# Patient Record
Sex: MALE
[De-identification: names, ages, dates, MRNs, and addresses within clinical notes are randomized per-mention and may not be internally consistent; named-entity substitution may affect disease eponyms.]

---

## 2019-09-04 LAB
BASOPHILS # BLD AUTO: 0.1 10*3/UL (ref 0–0.1)
BASOPHILS NFR BLD AUTO: 0.7 % (ref 0–1)
DEPRECATED NEUTROPHILS # BLD AUTO: 3.5 10*3/UL (ref 2.1–6.9)
EOSINOPHIL # BLD AUTO: 0.2 10*3/UL (ref 0–0.4)
EOSINOPHIL NFR BLD AUTO: 2.5 % (ref 0–6)
ERYTHROCYTE [DISTWIDTH] IN CORD BLOOD: 12.4 % (ref 11.7–14.4)
HCT VFR BLD AUTO: 43.2 % (ref 38.2–49.6)
HGB BLD-MCNC: 14.7 G/DL (ref 14–18)
LYMPHOCYTES # BLD: 2.8 10*3/UL (ref 1–3.2)
LYMPHOCYTES NFR BLD AUTO: 39.6 % (ref 18–39.1)
MCH RBC QN AUTO: 32.1 PG (ref 28–32)
MCHC RBC AUTO-ENTMCNC: 34 G/DL (ref 31–35)
MCV RBC AUTO: 94.3 FL (ref 81–99)
MONOCYTES # BLD AUTO: 0.6 10*3/UL (ref 0.2–0.8)
MONOCYTES NFR BLD AUTO: 8.6 % (ref 4.4–11.3)
NEUTS SEG NFR BLD AUTO: 48 % (ref 38.7–80)
PLATELET # BLD AUTO: 331 X10E3/UL (ref 140–360)
RBC # BLD AUTO: 4.58 X10E6/UL (ref 4.3–5.7)

## 2019-09-04 NOTE — DIAGNOSTIC IMAGING REPORT
EXAM:  CHEST 2 VIEWS



DATE: 9/4/2019 10:53 AM  



INDICATION: Hallux valgus, left foot pain, preoperative evaluation 



COMPARISON: None



FINDINGS:

The trachea is midline. The lungs are symmetrically expanded without evidence

for large focal consolidation, pneumothorax, or significant pleural effusion.



The cardiomediastinal silhouette and pulmonary vasculature are within normal

limits. No acute osseous abnormality is identified. The surrounding soft

tissues are unremarkable.





IMPRESSION:



No acute cardiopulmonary process identified.



Signed by: Dr. Ray Watters MD on 9/4/2019 12:45 PM

## 2019-09-05 ENCOUNTER — HOSPITAL ENCOUNTER (OUTPATIENT)
Dept: HOSPITAL 88 - OR | Age: 39
Discharge: HOME | End: 2019-09-05
Attending: PODIATRIST
Payer: COMMERCIAL

## 2019-09-05 VITALS — DIASTOLIC BLOOD PRESSURE: 93 MMHG | SYSTOLIC BLOOD PRESSURE: 146 MMHG

## 2019-09-05 DIAGNOSIS — Z01.818: ICD-10-CM

## 2019-09-05 DIAGNOSIS — M20.12: Primary | ICD-10-CM

## 2019-09-05 DIAGNOSIS — M72.2: ICD-10-CM

## 2019-09-05 DIAGNOSIS — R73.03: ICD-10-CM

## 2019-09-05 DIAGNOSIS — Z01.810: ICD-10-CM

## 2019-09-05 DIAGNOSIS — Z01.812: ICD-10-CM

## 2019-09-05 DIAGNOSIS — G47.33: ICD-10-CM

## 2019-09-05 DIAGNOSIS — M77.32: ICD-10-CM

## 2019-09-05 DIAGNOSIS — I10: ICD-10-CM

## 2019-09-05 DIAGNOSIS — G57.52: ICD-10-CM

## 2019-09-05 DIAGNOSIS — G58.8: ICD-10-CM

## 2019-09-05 DIAGNOSIS — M20.42: ICD-10-CM

## 2019-09-05 PROCEDURE — 28285 REPAIR OF HAMMERTOE: CPT

## 2019-09-05 PROCEDURE — 73620 X-RAY EXAM OF FOOT: CPT

## 2019-09-05 PROCEDURE — 64704 REVISE HAND/FOOT NERVE: CPT

## 2019-09-05 PROCEDURE — 28035 DECOMPRESSION OF TIBIA NERVE: CPT

## 2019-09-05 PROCEDURE — 85025 COMPLETE CBC W/AUTO DIFF WBC: CPT

## 2019-09-05 PROCEDURE — 82948 REAGENT STRIP/BLOOD GLUCOSE: CPT

## 2019-09-05 PROCEDURE — 71046 X-RAY EXAM CHEST 2 VIEWS: CPT

## 2019-09-05 PROCEDURE — 36415 COLL VENOUS BLD VENIPUNCTURE: CPT

## 2019-09-05 PROCEDURE — 28296 COR HLX VLGS DSTL MTAR OSTEO: CPT

## 2019-09-05 PROCEDURE — 28119 REMOVAL OF HEEL SPUR: CPT

## 2019-09-05 PROCEDURE — 93005 ELECTROCARDIOGRAM TRACING: CPT

## 2019-09-05 NOTE — XMS REPORT
Clinical Summary

                             Created on: 2019



Steven Brown

External Reference #: [id number]

: 1980

Sex: Male



Demographics







                          Address                   [address]

[place]  [address]

 

                          Home Phone                [phone]

 

                          Preferred Language        English

 

                          Marital Status            

 

                          Tenriism Affiliation     [id number]

 

                          Race                      White

 

                          Ethnic Group              /Latin





Author







                          Author                    [facility]

 

                          Organization              [facility]

 

                          Address                   Unknown

 

                          Phone                     Unavailable







Support







                Name            Relationship    Address         Phone

 

                Candi Brown    [employer]            Unknown         [phone]







Care Team Providers







                    Care Team Member Name    Role                Phone

 

                    Carly Hoffman NP    PCP                 [phone]







Allergies

Not on File



Medications

Not on file



Active Problems





Not on file



Encounters







                          Care Team                 Description



                     Date                Type                Specialty  

 

                                        



Kyler Douglas MD Kreit, Camil I, MD                      Lifelong obesity; 

Mixed hyperlipidemia; 

Type II diabetes mellitus with coma, uncontrolled; 

Essential hypertension, malignant



                     09/10/2018          Hospital            Radiology  



                                         Encounter   

 

                                        



Kyler Douglas MD                 Lifelong obesity (Primary Dx); 

Mixed hyperlipidemia; 

Type II diabetes mellitus with coma, uncontrolled; 

Essential hypertension, malignant



                     2018          Transcribe          Access  



                                         Orders   



after 2018



Social History







                                        Date



                 Tobacco Use     Types           Packs/Day       Years Used 

 

                                         



                                         Never Assessed    









 



                           Sex Assigned at Birth     Date Recorded

 

 



                                         Not on file 









                                        Industry



                           Job Start Date            Occupation 

 

                                        Not on file



                           Not on file               Not on file 









                                        Travel End



                           Travel History            Travel Start 

 





                                         No recent travel history available.







Last Filed Vital Signs

Not on file



Plan of Treatment







   



                 Health Maintenance     Due Date        Last Done       Comments

 

   



                           DIABETIC RETINAL EYE EXAM     1980  

 

   



                           DIABETIC FOOT EXAM        1989  

 

   



                           URINE MICROALBUMIN        1989  

 

   



                           INFLUENZA VACCINE         2019  







Procedures







                                        Comments



                 Procedure Name     Priority        Date/Time       Associated Diagnosis 

 

                                        



Results for this procedure are in the results section.



                 CT ABDOMEN W CONTRAST     Routine         09/10/2018      Lifelong obesity 



                           9:50 AM CDT               Mixed hyperlipidemia 



                                         Type II diabetes mellitus 



                                         with coma, uncontrolled 



                                         Essential hypertension, 



                                         malignant 



after 2018



Results

* CT Abdomen W Contrast (09/10/2018  9:50 AM CDT)









                                         Specimen

 











 



                           Narrative                 Performed At

 

 



                           EXAMINATION:CT ABDOMEN W CONTRAST     [facility]



                                         CLINICAL HISTORY:E66.9 Obesityunspecified, E78.2 Mixed hyperlipidemia, 





                                         E66.9 



                                         TECHNIQUE:Multiple axial images of the abdomen were obtained following 



                                         intravenous administration of iodinated contrast. Sagittal and coronal 



                                         computerized reformatted images were also obtained. CT scans are performed using

 



                                         radiation dose reduction 



                                         techniques. Technical factors are evaluated and adjusted to ensure appropriate 





                                         moderation of exposure. Automated dose management technology is applied to 



                                         adjust radiation exposure while achieving a diagnostic quality image. 



                                         COMPARISON:None. 



                                         LUNG BASES: 



                                         No suspicious lung nodule. Minimal atelectasis. No pleural effusion. Partially 





                                         seen heart is within normal limits. 



                                         ABDOMEN: 



                                         1. Liver: Liver is normal in size and contour. A 4 mm hypodensity in the liver 





                                         segment 6 on series 2 image 53 is too small to characterize. No intrahepatic 



                                         biliary ductal dilation. 



                                         2. Gallbladder: Gallbladder is normal in appearance. No extrahepatic biliary 



                                         ductal dilation. 



                                         3. Spleen: Spleen is normal in size. 



                                         4. Pancreas: Pancreas is normal in appearance. No ductal dilation or evidence 



                                         for mass lesion. 



                                         5. Adrenal Glands: Adrenals are normal in appearance. 



                                         6. Kidneys: Kidneys are normal in appearance. No mass lesion, hydronephrosis, or

 



                                         nephrolithiasis. 



                                         7. Nodes: No enlarged abdominal, mesenteric or retroperitoneal lymph node. 



                                         8. Bowel: Stomach, small bowel and colon are without acute anatomic abnormality.

 



                                         Appendix is normal. 



                                         9. Peritoneum: No free fluid or free air. No peritoneal nodules. 



                                         10.Retroperitoneum: Psoas muscles are symmetric. No retroperitoneal 



                                         hemorrhage, fluid collection, or mass lesion. 



                                         11.Aorta: No aneurysm. 



                                         PELVIS: 



                                         1.Bladder: Bladder is normal in apperance.. 



                                         2.: Prostate is possibly slightly enlarged at 3.5 x 5.2 cm. Seminal 



                                         vesicles are within normal limits symmetric. 



                                         3.Nodes: No enlarged pelvic or inguinal lymph node. 



                                         4.Free Fluid: No pelvic free fluid. 



                                         BONES: 



                                         No destructive bone lesion. 



                                         IMPRESSION: 



                                         1.No acute abdominopelvic process is appreciated. 



                                         I personally reviewed the images and the resident's findings and agree with the

 



                                         final report. 



                                         [facility]-[id number] 









                                        Procedure Note

 

                                        



[facility], Radiology Results Incoming - 09/10/2018 10:43 AM CDT



EXAMINATION:  CT ABDOMEN W CONTRAST



CLINICAL HISTORY:  E66.9 Obesity  unspecified, E78.2 Mixed hyperlipidemia, E66.9



TECHNIQUE:  Multiple axial images of the abdomen were obtained following 
intravenous administration of iodinated contrast. Sagittal and coronal 
computerized reformatted images were also obtained. CT scans are performed using
radiation dose reduction 

techniques. Technical factors are evaluated and adjusted to ensure appropriate 
moderation of exposure. Automated dose management technology is applied to 
adjust radiation exposure while achieving a diagnostic quality image.



COMPARISON:  None.



LUNG BASES: 



No suspicious lung nodule. Minimal atelectasis. No pleural effusion. Partially 
seen heart is within normal limits.



ABDOMEN:



                                        1. Liver: Liver is normal in size and contour. A 4 mm hypodensity in the liver segment

 6 on series 2 image 53 is too small to characterize. No intrahepatic biliary 
ductal dilation.



                                        2. Gallbladder: Gallbladder is normal in appearance. No extrahepatic biliary ductal

 dilation.



                                        3. Spleen: Spleen is normal in size.



                                        4. Pancreas: Pancreas is normal in appearance. No ductal dilation or evidence for

 mass lesion.



                                        5. Adrenal Glands: Adrenals are normal in appearance.



                                        6. Kidneys: Kidneys are normal in appearance. No mass lesion, hydronephrosis, or

 nephrolithiasis.



                                        7. Nodes: No enlarged abdominal, mesenteric or retroperitoneal lymph node.



                                        8. Bowel: Stomach, small bowel and colon are without acute anatomic abnormality.

 Appendix is normal.



                                        9. Peritoneum: No free fluid or free air. No peritoneal nodules.



                                        10.  Retroperitoneum: Psoas muscles are symmetric. No retroperitoneal hemorrhage,

 fluid collection, or mass lesion.



                                        11.  Aorta: No aneurysm.



PELVIS:



                                        1.  Bladder: Bladder is normal in apperance..



                                        2.  : Prostate is possibly slightly enlarged at 3.5 x 5.2 cm. Seminal vesicles

 are within normal limits symmetric.



                                        3.  Nodes: No enlarged pelvic or inguinal lymph node.



                                        4.  Free Fluid: No pelvic free fluid.



BONES: 



No destructive bone lesion.





IMPRESSION:



                                        1.  No acute abdominopelvic process is appreciated.









I personally reviewed the images and the resident's findings and agree with the 
final report.



[facility]-[id number]











   



                 Performing Organization     Address         City/State/Zipcode     Phone Number

 

   



                     [facility]          [address] [place] [address] 





after 2018



Insurance







                                        Type



            Payer      Benefit     Subscriber ID     Effective     Phone      Address 



                           Plan /                    Dates   



                                         Group     

 

                                        HMO



                 AETNA           AETNA           xxxxxxxxxx      2008-   



                           HMO,POS,EP                Present   



                                         O, [employer]     









     



            Guarantor Name     Account     Relation to     Date of     Phone      Billing Address



                     Type                Patient             Birth  

 

     



            Steven Brown     Personal/F     Self       1980     [phone]     [address]



                     [facility]               (Home)              [place] [address]







Advance Directives





For more information, please contact: [phone]









                          Patient Representative    Explanation



                           Type                      Date Recorded  

 

                                                     



                                         Advance Directives,   



                                         Living Will and   



                                         Medical Power of

## 2019-09-05 NOTE — DIAGNOSTIC IMAGING REPORT
EXAMINATION:  FOOT LEFT AP   LAT    



INDICATION: Postoperative



COMPARISON: None

     

FINDINGS:

There are post surgical findings of the distal left first metatarsal status

post osteotomy and hardware fixation. Alignment is near-anatomic. No unexpected

fracture. K wire fixation traverses the fourth interphalangeal joints. Drain in

place. Overlying gauze material obscures fine bony detail.



IMPRESSION: 

Postoperative findings of the left foot as above.



Signed by: Lexx Dutta MD on 9/5/2019 9:54 AM

## 2019-09-05 NOTE — XMS REPORT
Patient Summary Document

                             Created on: 2019



MEGHANA ORTEGA

External Reference #: [id number]

: 1980

Sex: Male



Demographics







                          Address                   [address] [place] [address]  [id number]

 

                          Home Phone                [phone]

 

                          Preferred Language        Unknown

 

                          Marital Status            Unknown

 

                          Yazidism Affiliation     Unknown

 

                          Race                      Unknown

 

                                        Additional Race(s)  

 

                          Ethnic Group              Unknown





Author







                          Author                    [facility]

 

                          Address                   Unknown

 

                          Phone                     Unavailable







Care Team Providers







                    Care Team Member Name    Role                Phone

 

                    BANG ODEN        Unavailable         Unavailable







Problems

This patient has no known problems.



Allergies, Adverse Reactions, Alerts

This patient has no known allergies or adverse reactions.



Medications

This patient has no known medications.



Results







           Test Description    Test Time    Test Comments    Text Results    Atomic Results    Result

 Comments

 

                CHEST 2 VIEWS    2019 12:44:00                                                             

                                             [place] [address]  
   Patient Name: MEGHANA ORTEGA                                   MR #: 
[id number]                     : 1980                                  
Age/Sex: 39/M  Acct #: [id number]                              Req #: [id number]
[id number]  Adm Physician:                                                      
Ordered by: BANG ODEN DPM                            Report #: [id number]      
 Location: OR                                      Room/Bed:                    
 
___________________________________________________________________________________________________
   Procedure: [id number] DX/CHEST 2 VIEWS  Exam Date: 19                   
        Exam Time: 1147                                              REPORT 
STATUS: Signed    EXAM:  CHEST 2 VIEWS      DATE: 2019 10:53 AM        I
NDICATION: Hallux valgus, left foot pain, preoperative evaluation       
COMPARISON: None      FINDINGS:   The trachea is midline. The lungs are 
symmetrically expanded without evidence   for large focal consolidation, 
pneumothorax, or significant pleural effusion.      The cardiomediastinal 
silhouette and pulmonary vasculature are within normal   limits. No acute 
osseous abnormality is identified. The surrounding soft   tissues are 
unremarkable.         IMPRESSION:      No acute cardiopulmonary process 
identified.      Signed by: Dr. Ray Watters MD on 2019 12:45 PM        
Dictated By: RAY WATTERS MD  Electronically Signed By: RAY WATTERS MD on 
19 1245  Transcribed By: MAURI on 19 1245       COPY TO:   
BANG ODEN DPM

## 2019-09-05 NOTE — OPERATIVE REPORT
DATE OF PROCEDURE:  09/05/2019

 

SURGEON:  Hermilo Abebe DPM

 

PREOPERATIVE DIAGNOSES:  

1. Painful hallux valgus deformity, left foot.

2. Painful contracted hammertoes 4th digit, left.

3. Painful contracted hammertoes 5th digit, left.

4. Painful plantar fasciitis with heel spur syndrome, left foot.

5. Tarsal tunnel syndrome, left foot.

 

POSTOPERATIVE DIAGNOSES:  Confirmed.

 

OPERATIVE PROCEDURES:  

1. Chava bunionectomy with screw fixation of left foot.

2. Arthroplasty 4th digit with K-wire fixation of 4th toe, left foot.

3. Arthroplasty 5th digit, left foot.

4. Resection of plantar calcaneal heel spur, left foot.

5. Neurolysis tibial nerve, left foot.

6. Neurolysis medial plantar nerve, left foot.

7. Neurolysis lateral plantar nerve, left foot.

8. Intraoperative use of fluoroscopy.

9. Trigger point shot of cortisone.

10. Application of posterior splint.

 

ANESTHESIA:  General.

 

HEMOSTASIS:  Pneumatic thigh tourniquet at 350 mmHg.

 

PROCEDURE IN DETAIL:  The patient was taken into the operating room and placed on the

operating table in supine position.  Following induction of general anesthesia by the

anesthesiologist, Webril wraps were placed on the patient's left thigh, followed by

application of left thigh tourniquet.  The left lower extremity was then prepped and

draped in the usual aseptic manner and following procedures then performed. 

 

Procedure #1:  Chava bunionectomy with screw fixation, left foot.  Attention was

directed to the dorsomedial aspect of the 1st MPJ, where a 6 cm linear incision was

performed.  Incision was deepened down to the joint capsule.  Longitudinal capsulotomy

was then performed exposing the dorsomedial exostosis of the 1st metatarsal head.  Via

the use of an oscillating saw, dorsomedial exostosis was excised from the operation site

in toto.  A V-osteotomy was then performed from medial to lateral.  Capital fragment was

then transpositioned laterally upon adequate surgical and anatomical reduction utilizing

proper AO technique.  A 2.0, 16 mm cortical screw in conjunction with a buried 0.045

K-wire was used to achieve stability of osteotomy site.  A redundant bone medially was

excised via the use of an oscillating saw and rotating bur. 

 

Procedures #2 and 3:  Arthroplasty 4th and 5th digits with K-wire fixation of 4th.

Attention was then directed to the dorsal aspect of 4th and 5th toes, where a 3 cm

linear incision was performed.  Incision was deepened down to the joint capsule.

Transverse capsulotomy was then performed exposing the head of the proximal phalanx.

Via the use of an oscillating saw, head of the proximal phalanxes were excised from the

operation site in toto.  All rough and bony edges were rasped smooth.  Then, 4th toe was

still noted to be contracted, so a 0.045 K-wire was introduced up to metatarsophalangeal

joint to achieve proper anatomical reduction. 

 

Procedure #4:  Resection of plantar calcaneal heel spur, left foot.  Attention was then

directed to the medial aspect of the left heel, where a 4 to 5 cm linear incision was

performed.  Incision was deepened down to the plantar fascia level.  The medial one-half

of the plantar fascia was then released exposing the plantar calcaneal heel spur.  Via

the use of a reciprocating bur, plantar calcaneal spur was excised from the operation

site in toto.  All rough and bony edges were rasped smooth and felt with the finger with

no evidence of any type of bony protrusion. 

 

Procedures #5, 6, and 7:  Neurolysis tibial nerve, neurolysis medial plantar nerve, and

neurolysis lateral plantar nerve.  Attention was then directed to the medial aspect of

the left tarsal canal, where a curvilinear incision was performed down to the reji

pedis.  The incision was deepened down to the flexor retinaculum utilizing meticulous

dissection.  The flexor retinaculum was then cut and neurolysis of the tibial nerve was

then performed via blunt dissection.  The incision was deepened down to the reji pedis

until the medial and lateral plantar nerves were divided and also neurolysis within the

reji pedis itself.  All areas were then copiously flushed with antibiotic solution and

suction. 

 

Procedure #8:  Intraoperative use of fluoroscopy was then used to make sure proper

alignment and fixation was achieved.  Closure was then obtained utilizing 3-0 Vicryl,

4-0 Vicryl, and 4-0 nylon for capsule, subcutaneous tissue, and skin respectively after

properly and copiously flushing the areas with saline. 

 

Procedure #9:  Trigger point shot of cortisone was then given to the 1st and 4th

interspace of the left foot and also the left heel and medial tarsal canal.  Then,

approximately 15 to 20 mL of 0.5% plain Marcaine plus 10 mL of 1% Xylocaine plain were

used to achieve local anesthesia of above-mentioned surgical areas.  Sterile dressing

was applied.  Upon release of the thigh tourniquet, blood hyperemia was noted immediate

to all digits of the patient's left foot.  Prior to closure, a TLS drain was inserted

into the heel area to prevent a hematoma formation. 

 

Procedure #10:  Application of posterior splint.  A properly placed posterior splint was

then applied keeping the foot at 90 degrees with respect to the leg to try and prevent

any postop complications.  The patient was then transferred from the OR to recovery room

with vital signs stable and neurovascular status intact.  No intraoperative

complications were encountered.  Blood loss from the surgery was minimal.  The patient

to remain nonweightbearing with the aid of crutches, keep his foot elevated and is to

apply an ice pack to the ankle joint area. 

 

 

 

 

______________________________

GENE Troy/SCOTT

D:  09/05/2019 08:34:08

T:  09/05/2019 14:38:38

Job #:  [id number]

## 2019-12-02 LAB
ANION GAP SERPL CALC-SCNC: 11.4 MMOL/L (ref 8–16)
BUN SERPL-MCNC: 11 MG/DL (ref 7–26)
BUN/CREAT SERPL: 13 (ref 6–25)
CALCIUM SERPL-MCNC: 10.1 MG/DL (ref 8.4–10.2)
CHLORIDE SERPL-SCNC: 105 MMOL/L (ref 98–107)
CO2 SERPL-SCNC: 25 MMOL/L (ref 22–29)
EGFRCR SERPLBLD CKD-EPI 2021: > 60 ML/MIN (ref 60–?)
GLUCOSE SERPLBLD-MCNC: 124 MG/DL (ref 74–118)
POTASSIUM SERPL-SCNC: 3.4 MMOL/L (ref 3.5–5.1)
SODIUM SERPL-SCNC: 138 MMOL/L (ref 136–145)

## 2019-12-11 ENCOUNTER — HOSPITAL ENCOUNTER (OUTPATIENT)
Dept: HOSPITAL 88 - OR | Age: 39
Discharge: HOME | End: 2019-12-11
Attending: PODIATRIST
Payer: COMMERCIAL

## 2019-12-11 VITALS — SYSTOLIC BLOOD PRESSURE: 136 MMHG | DIASTOLIC BLOOD PRESSURE: 85 MMHG

## 2019-12-11 DIAGNOSIS — Z01.812: ICD-10-CM

## 2019-12-11 DIAGNOSIS — Z01.810: ICD-10-CM

## 2019-12-11 DIAGNOSIS — M20.41: ICD-10-CM

## 2019-12-11 DIAGNOSIS — M20.11: Primary | ICD-10-CM

## 2019-12-11 DIAGNOSIS — M77.31: ICD-10-CM

## 2019-12-11 DIAGNOSIS — M79.2: ICD-10-CM

## 2019-12-11 DIAGNOSIS — I10: ICD-10-CM

## 2019-12-11 DIAGNOSIS — M79.671: ICD-10-CM

## 2019-12-11 DIAGNOSIS — E11.9: ICD-10-CM

## 2019-12-11 DIAGNOSIS — G47.33: ICD-10-CM

## 2019-12-11 PROCEDURE — 36415 COLL VENOUS BLD VENIPUNCTURE: CPT

## 2019-12-11 PROCEDURE — 73620 X-RAY EXAM OF FOOT: CPT

## 2019-12-11 PROCEDURE — 80048 BASIC METABOLIC PNL TOTAL CA: CPT

## 2019-12-11 PROCEDURE — 28296 COR HLX VLGS DSTL MTAR OSTEO: CPT

## 2019-12-11 PROCEDURE — 64704 REVISE HAND/FOOT NERVE: CPT

## 2019-12-11 PROCEDURE — 28119 REMOVAL OF HEEL SPUR: CPT

## 2019-12-11 PROCEDURE — 28285 REPAIR OF HAMMERTOE: CPT

## 2019-12-11 PROCEDURE — 93005 ELECTROCARDIOGRAM TRACING: CPT

## 2019-12-11 NOTE — OPERATIVE REPORT
DATE OF PROCEDURE:  12/11/2019

 

SURGEON:  Hermilo Abebe DPM

 

PREOPERATIVE DIAGNOSES:  

1. Painful hallux valgus deformity, right foot.

2. Painful contracted hammertoe, 4th digit, right foot.

3. Painful contracted hammertoe, 5th digit, right foot.

4. Painful plantar fasciitis with heel spur syndrome.

5. Painful tarsal tunnel syndrome.

 

POSTOPERATIVE DIAGNOSES:  Confirmed.

 

OPERATIVE PROCEDURES:  

1. Chava bunionectomy with screw fixation, right foot.

2. Arthroplasty of 4th digit with K-wire fixation.

3. Arthroplasty of 5th digit.

4. Resection of plantar calcaneal heel spur.

5. Neurolysis, tibial nerve.

6. Neurolysis, medial plantar nerve.

7. Neurolysis, lateral plantar nerve.

8. Intraoperative use of fluoroscopy.

9. Trigger point shot of cortisone.

10. Application of posterior splint.

 

ANESTHESIA:  General.

 

HEMOSTASIS:  Pneumatic thigh tourniquet at 350 mmHg.

 

PROCEDURE IN DETAIL:  The patient was taken into the operating room and placed on the

operating table in supine position.  Following induction of general anesthesia by the

anesthesiologist, Webril wraps were placed on the patient's right thigh, followed by

application of right thigh tourniquet.  The right lower extremity was then prepped and

draped in the usual aseptic manner and following procedures were then performed: 

 

Procedure #1:  Chava bunionectomy with screw fixation, right foot.  Attention was

directed to the dorsal medial aspect of the 1st MPJ, where a 6 cm linear incision was

performed.  Incision was deepened down to the joint capsule.  Longitudinal capsulotomy

was then performed exposing the dorsal medial exostosis of the 1st metatarsal head.  Via

use of an oscillating saw, dorsal medial exostosis was excised from the operation site

in toto.  A V-osteotomy was then performed from medial to lateral.  Capital fragment was

then transpositioned laterally upon adequate surgical and anatomic reduction.  Utilizing

proper AO technique, a 2.0, 16 mm cortical screw in conjunction with a buried 0.045

K-wire was introduced to achieve proper fixation.  All redundant bone medially was

excised via the use of an oscillating saw and rotating bur. 

 

Procedures 2 and 3:  Arthroplasty of 4th and 5th digits with K-wire fixation of 4th.

Attention was then directed to the dorsal aspect of the above-mentioned toes overlying

the proximal interphalangeal joint, where a 3 cm linear incision was performed.

Incision was then deepened down to the joint capsule.  Transverse capsulotomy was then

performed exposing the head of the proximal phalanx.  Via the use of an oscillating saw,

head of the proximal phalanxes were excised from the operation site in toto.  All rough

and bony edges were rasped smooth.  Fourth toe was still noted to be contracted, so a

0.045 K-wire was introduced up to metatarsophalangeal joint to achieve proper anatomic

reduction. 

 

Procedure #4:  Resection of plantar calcaneal spur, right foot.  Attention was then

directed to the medial aspect of the right heel, where a 6 cm linear incision was

performed.  Incision was deepened down to the plantar fascia level.  Medial half of the

plantar fascia was resected from its insertion site, exposing the plantar calcaneal

spur.  Via the use of a reciprocating rasp, the spur was excised from the operation site

in toto.  All rough and bony edges were rasped smooth. 

 

Procedures 5, 6, and 7:  Neurolysis of the tibial nerve, neurolysis of medial plantar

nerve, and neurolysis of lateral plantar nerve.  Attention was then directed to the

medial aspect of the right tarsal canal, where a curvilinear incision was performed

posterior to the medial malleolus.  The incision was then carried down to the flexor

retinaculum and utilizing meticulous dissection, the flexor retinaculum was then cut

exposing the tibial nerve.  Via the use of a blunt dissection, the tibial nerve was then

released from any soft tissue adhesions.  The incision was then carried to the reji

pedis.  The medial and lateral plantar nerves were noted to divide and were also

released from any adhesions via blunt dissection.  All areas were then copiously flushed

with sterile antibiotic solution and suctioned. 

 

Procedure #8:  Intraoperative use of fluoroscopy then used to make sure proper alignment

fixation was achieved with adequate resection of plantar calcaneal spur.  Closure was

then obtained utilizing 3-0 Vicryl, 4-0 Vicryl, and 4-0 nylon for capsule, subcutaneous

tissue, and skin respectively 

 

Procedure #9:  Trigger point shot of cortisone was then given to the 1st interspace, 4th

interspace, medial aspect of the right heel, and tarsal canal.  Then, approximately 20

mL of 0.5% plain Marcaine plus 10 mL of 1% Xylocaine plain were used to achieve local

anesthesia of above-mentioned surgical area.  Sterile dressing was applied.  Upon

release of the thigh tourniquet, blood hyperemia was noted immediate to all digits of

the patient's right foot. 

 

Procedure #10:  Application of posterior splint.  A properly placed posterior splint was

then applied keeping the foot at 90 degrees with respect to the leg to try for any type

of postop complications.  The patient was then transferred from the OR to recovery room

with vital signs stable and neurovascular status intact.  No intraoperative

complications were encountered.  Blood loss from the surgery was minimal.  The patient

is to remain nonweightbearing with the aid of crutches, keep his foot elevated, and is

to apply an ice pack to the ankle joint area. 

 

 

 

 

______________________________

GENE Troy/SCOTT

D:  12/11/2019 09:00:05

T:  12/11/2019 11:00:19

Job #:  [id number]

## 2019-12-11 NOTE — DIAGNOSTIC IMAGING REPORT
EXAM:  FOOT RIGHT AP   LAT



DATE: 12/11/2019 9:06 AM  



INDICATION: Postoperative  



COMPARISON: None



FINDINGS:

There postsurgical changes the distal aspect of the first metatarsal with

fixation hardware. Alignment is near anatomic. Fixation wire noted over the

fourth digit. Surgical drainage catheter noted in place. There is no evidence

for acute fracture.





IMPRESSION:



Postoperative changes of the right foot as detailed above.



Signed by: Dr. Ray Watters MD on 12/11/2019 9:38 AM